# Patient Record
Sex: MALE | Race: WHITE | NOT HISPANIC OR LATINO | URBAN - METROPOLITAN AREA
[De-identification: names, ages, dates, MRNs, and addresses within clinical notes are randomized per-mention and may not be internally consistent; named-entity substitution may affect disease eponyms.]

---

## 2024-08-28 ENCOUNTER — APPOINTMENT (OUTPATIENT)
Dept: PRIMARY CARE | Facility: CLINIC | Age: 44
End: 2024-08-28
Payer: COMMERCIAL

## 2024-08-28 ENCOUNTER — TELEPHONE (OUTPATIENT)
Dept: PRIMARY CARE | Facility: CLINIC | Age: 44
End: 2024-08-28

## 2024-08-28 VITALS
RESPIRATION RATE: 18 BRPM | DIASTOLIC BLOOD PRESSURE: 68 MMHG | SYSTOLIC BLOOD PRESSURE: 118 MMHG | BODY MASS INDEX: 26.66 KG/M2 | HEIGHT: 65 IN | OXYGEN SATURATION: 96 % | HEART RATE: 104 BPM | WEIGHT: 160 LBS

## 2024-08-28 DIAGNOSIS — Z83.438 FAMILY HISTORY OF DYSLIPIDEMIA: ICD-10-CM

## 2024-08-28 DIAGNOSIS — Z12.11 ENCOUNTER FOR COLORECTAL CANCER SCREENING USING COLOGUARD TEST: ICD-10-CM

## 2024-08-28 DIAGNOSIS — E78.5 DYSLIPIDEMIA: ICD-10-CM

## 2024-08-28 DIAGNOSIS — Z12.12 ENCOUNTER FOR COLORECTAL CANCER SCREENING USING COLOGUARD TEST: ICD-10-CM

## 2024-08-28 DIAGNOSIS — F41.9 ANXIETY: ICD-10-CM

## 2024-08-28 DIAGNOSIS — G47.00 SLEEP INITIATION DYSFUNCTION: ICD-10-CM

## 2024-08-28 DIAGNOSIS — Z80.42 FAMILY HISTORY OF PROSTATE CANCER IN FATHER: ICD-10-CM

## 2024-08-28 DIAGNOSIS — Z71.89 ENCOUNTER FOR CARDIAC RISK COUNSELING: ICD-10-CM

## 2024-08-28 DIAGNOSIS — Z00.00 ROUTINE GENERAL MEDICAL EXAMINATION AT A HEALTH CARE FACILITY: ICD-10-CM

## 2024-08-28 DIAGNOSIS — Z11.3 ROUTINE SCREENING FOR STI (SEXUALLY TRANSMITTED INFECTION): Primary | ICD-10-CM

## 2024-08-28 PROCEDURE — 1036F TOBACCO NON-USER: CPT | Performed by: STUDENT IN AN ORGANIZED HEALTH CARE EDUCATION/TRAINING PROGRAM

## 2024-08-28 PROCEDURE — 3008F BODY MASS INDEX DOCD: CPT | Performed by: STUDENT IN AN ORGANIZED HEALTH CARE EDUCATION/TRAINING PROGRAM

## 2024-08-28 PROCEDURE — 99386 PREV VISIT NEW AGE 40-64: CPT | Performed by: STUDENT IN AN ORGANIZED HEALTH CARE EDUCATION/TRAINING PROGRAM

## 2024-08-28 RX ORDER — SIMVASTATIN 20 MG/1
40 TABLET, FILM COATED ORAL DAILY
COMMUNITY

## 2024-08-28 RX ORDER — TRAZODONE HYDROCHLORIDE 100 MG/1
100 TABLET ORAL NIGHTLY
Qty: 90 TABLET | Refills: 1 | Status: SHIPPED | OUTPATIENT
Start: 2024-08-28

## 2024-08-28 RX ORDER — HYDROXYZINE HYDROCHLORIDE 25 MG/1
25 TABLET, FILM COATED ORAL EVERY 8 HOURS PRN
Qty: 90 TABLET | Refills: 3 | Status: SHIPPED | OUTPATIENT
Start: 2024-08-28 | End: 2024-12-26

## 2024-08-28 RX ORDER — TRAZODONE HYDROCHLORIDE 100 MG/1
100 TABLET ORAL NIGHTLY
COMMUNITY
End: 2024-08-28 | Stop reason: SDUPTHER

## 2024-08-28 ASSESSMENT — ENCOUNTER SYMPTOMS
OCCASIONAL FEELINGS OF UNSTEADINESS: 0
DEPRESSION: 0
LOSS OF SENSATION IN FEET: 0

## 2024-08-28 ASSESSMENT — PATIENT HEALTH QUESTIONNAIRE - PHQ9
SUM OF ALL RESPONSES TO PHQ9 QUESTIONS 1 AND 2: 0
1. LITTLE INTEREST OR PLEASURE IN DOING THINGS: NOT AT ALL
2. FEELING DOWN, DEPRESSED OR HOPELESS: NOT AT ALL

## 2024-08-28 ASSESSMENT — PAIN SCALES - GENERAL: PAINLEVEL: 0-NO PAIN

## 2024-08-28 NOTE — PROGRESS NOTES
Kamran Prabhakar is a 44 y.o. male who is presenting for annual physical exam.     Last  Visit: 1 year  Reported Health: Good    Dental, Vision, Hearing:  Regular dental visits: yes  - Brushes teeth 2 times per day  - Flosses? yes  Vision problems: no  - Wears glasses or contacts? yes  - Last eye exam:  up to date  Hearing loss: no    Immunization status:  Up to date: yes    Lifestyle:  Healthy diet: yes  Regular exercise: no  Alcohol: yes  Tobacco: no  Drugs: no    Reproductive Health:  Erectile dysfunction: no    Colorectal Cancer Screening:  Last colonoscopy or Cologuard: ordered cologuard  Metabolic Screening:  Lipid profile: ordered  Glucose screen: ordered    Review of Systems  Gen: denies fever, chills, weight loss, fatigue  HEENT: denies sinus pressure, sinus congestion, runny nose, red eyes, itchy eyes, vision loss, ear pain, hearing loss, throat pain, trouble swallowing  Neck: denies neck pain, neck swelling or masses  Chest/breast: denies breast pain, breast lumps, nipple discharge  CV: denies chest pain, palpitations, fast heart rate, syncope  Resp: denies shortness of breath, cough, wheezing  GI: denies abdominal pain, nausea, diarrhea, constipation, hematochezia, melena  : denies dysuria, hematuria, vaginal/penile discharge, frequency  Endo: denies polydipsia, polyuria, heat/cold intolerance, weight change, hair thinning  Heme: denies easy bruising, easy bleeding  Neuro: denies headache, numbness, tingling, memory loss, changes in vision  MSK: denies joint pain, joint swelling, weakness  Psych: denies suicidal ideation, homicidal ideation, depression, anxiety, mood swings  Skin: denies rashes, abnormal lesions, itching, changes in moles     Previous History  Past Medical History:   Diagnosis Date    Insomnia      History reviewed. No pertinent surgical history.  Social History     Tobacco Use    Smoking status: Never     Passive exposure: Never    Smokeless tobacco: Never   Vaping Use     "Vaping status: Never Used   Substance Use Topics    Alcohol use: Yes     Alcohol/week: 4.0 standard drinks of alcohol     Types: 4 Cans of beer per week    Drug use: Never     Family History   Problem Relation Name Age of Onset    Hypertension Mother Ina     Hyperlipidemia Mother Ina     Diabetes Mother Ina     Heart disease Mother Ina     Hypertension Father Jesus     Hyperlipidemia Father Jesus     Diabetes Father Jesus     Prostate cancer Father Jesus     Hypertension Maternal Grandmother      Hyperlipidemia Maternal Grandmother      Diabetes Maternal Grandmother      Hypertension Maternal Grandfather      Hyperlipidemia Maternal Grandfather      Diabetes Maternal Grandfather      Hypertension Paternal Grandmother      Hyperlipidemia Paternal Grandmother      Diabetes Paternal Grandmother      Hypertension Paternal Grandfather      Hyperlipidemia Paternal Grandfather      Prostate cancer Paternal Grandfather      Diabetes Paternal Grandfather       Allergies   Allergen Reactions    Hydrocodone Hives and Rash     Current Outpatient Medications   Medication Instructions    hydrOXYzine HCL (ATARAX) 25 mg, oral, Every 8 hours PRN    simvastatin (ZOCOR) 40 mg, oral, Daily, Take one tablet in the morning    traZODone (DESYREL) 100 mg, oral, Nightly     Visit Vitals  /68 (BP Location: Left arm, Patient Position: Sitting, BP Cuff Size: Large adult)   Pulse 104   Resp 18   Ht 1.651 m (5' 5\")   Wt 72.6 kg (160 lb)   SpO2 96%   BMI 26.63 kg/m²   Smoking Status Never   BSA 1.82 m²       Physical Exam:  General: Alert and oriented, in no acute distress. Appears stated age, well-nourished, and well hydrated  HEENT:  - Head: Normocephalic and atraumatic   - Eyes: EOMI, PERRLA  - ENT: Hearing grossly intact. Mucus membranes pink and moist without lesions. Tonsils present without swelling or exudates. Good dentition. TMs gray  Neck: Supple. No stiffness. No thyromegaly or thyroid nodules  Heart: RRR. No murmurs, clicks, or " rubs  Lungs: Unlabored breathing. CTAB with no crackles, wheezes, or rhonchi  Abdomen: Normal BS in all 4 quadrants. Soft, non-tender, non-distended, with no masses  Extremities: Warm and well perfused. No edema. Normal peripheral pulses  Musculoskeletal: ROM intact. Strength 5/5 in BUE and BLE. No joint swelling. Normal gait and station  Neurological: Alert and oriented. No gross neurological deficits. Normal sensation. No weakness. DTRs +2/4   Psychological: Appropriate mood and affect  Skin: No rash, abnormal lesions, cyanosis, or erythema      Assessment & Plan  Routine screening for STI (sexually transmitted infection)    Orders:    C. Trachomatis / N. Gonorrhoeae, Amplified Detection; Future    Hepatitis C Antibody; Future    HIV 1/2 Antigen/Antibody Screen with Reflex to Confirmation; Future    Syphilis Screen with Reflex; Future    HSV1 IgG and HSV2 IgG; Future    Trichomonas vaginalis, Amplified; Future  Asymptomatic screening at this time.  Unknown whether he has had any exposures.  Patient reports being monogamous with his ex-wife, however states that she had admitted to some infidelity prior to their divorce.  Dyslipidemia    Orders:    CBC and Auto Differential; Future    Comprehensive Metabolic Panel; Future    Lipid Panel; Future    CT cardiac scoring wo IV contrast; Future    Family history of prostate cancer in father    Orders:    Prostate Specific Antigen, Screen; Future  Extensive family history of prostate cancer in multiple relatives.  His father had prostate cancer at age 50.  Therefore it is appropriate to start screening early for this patient.  Anxiety    Orders:    hydrOXYzine HCL (Atarax) 25 mg tablet; Take 1 tablet (25 mg) by mouth every 8 hours if needed for anxiety.  Patient had been prescribed benzos in the past.  Has been off of them for a long period of time and doing well, however currently going through divorce and having some breakthrough anxiety.  Would prefer to start with a  safer medication rather than a benzodiazepine.  Patient in agreement and sent prescription for hydroxyzine as needed to the pharmacy.  Sleep initiation dysfunction    Orders:    traZODone (Desyrel) 100 mg tablet; Take 1 tablet (100 mg) by mouth once daily at bedtime.    Encounter for colorectal cancer screening using Cologuard test    Orders:    Cologuard® colon cancer screening; Future    Cologuard® colon cancer screening    Routine general medical examination at a health care facility         Family history of dyslipidemia    Orders:    CT cardiac scoring wo IV contrast; Future    Encounter for cardiac risk counseling    Orders:    CT cardiac scoring wo IV contrast; Future  Patient does have extensive family history of dyslipidemia, as well as heart disease.  He has been doing well on simvastatin, however will also in his case obtain a CT cardiac score for risk stratification.     Will follow-up with patient regarding screening labs and Cologuard.  If all is well, would see him back around end of February early March for cholesterol.    Reviewed and approved by NERISSA ROMERO on 8/28/24 at 10:48 AM.

## 2024-09-04 ENCOUNTER — DOCUMENTATION (OUTPATIENT)
Dept: PRIMARY CARE | Facility: CLINIC | Age: 44
End: 2024-09-04
Payer: COMMERCIAL

## 2024-09-04 DIAGNOSIS — E78.5 DYSLIPIDEMIA: ICD-10-CM

## 2024-09-04 DIAGNOSIS — Z80.42 FAMILY HISTORY OF PROSTATE CANCER IN FATHER: ICD-10-CM

## 2024-09-04 DIAGNOSIS — Z11.3 ROUTINE SCREENING FOR STI (SEXUALLY TRANSMITTED INFECTION): ICD-10-CM

## 2024-09-12 ENCOUNTER — TELEPHONE (OUTPATIENT)
Dept: PRIMARY CARE | Facility: CLINIC | Age: 44
End: 2024-09-12
Payer: COMMERCIAL

## 2024-09-12 NOTE — TELEPHONE ENCOUNTER
Maria from [employer] would like a call back regarding patient's Labs.    Maria can be reached at [phone]

## 2024-12-11 ENCOUNTER — HOSPITAL ENCOUNTER (OUTPATIENT)
Dept: RADIOLOGY | Facility: HOSPITAL | Age: 44
Discharge: HOME | End: 2024-12-11
Payer: COMMERCIAL

## 2024-12-11 DIAGNOSIS — Z83.438 FAMILY HISTORY OF DYSLIPIDEMIA: ICD-10-CM

## 2024-12-11 DIAGNOSIS — Z71.89 ENCOUNTER FOR CARDIAC RISK COUNSELING: ICD-10-CM

## 2024-12-11 DIAGNOSIS — E78.5 DYSLIPIDEMIA: ICD-10-CM

## 2024-12-11 PROCEDURE — 75571 CT HRT W/O DYE W/CA TEST: CPT

## 2025-03-05 ENCOUNTER — APPOINTMENT (OUTPATIENT)
Dept: PRIMARY CARE | Facility: CLINIC | Age: 45
End: 2025-03-05
Payer: COMMERCIAL

## 2025-05-05 DIAGNOSIS — G47.00 SLEEP INITIATION DYSFUNCTION: ICD-10-CM

## 2025-05-05 RX ORDER — TRAZODONE HYDROCHLORIDE 100 MG/1
100 TABLET ORAL NIGHTLY
Qty: 90 TABLET | Refills: 1 | Status: SHIPPED | OUTPATIENT
Start: 2025-05-05